# Patient Record
Sex: FEMALE | Race: WHITE | NOT HISPANIC OR LATINO | URBAN - METROPOLITAN AREA
[De-identification: names, ages, dates, MRNs, and addresses within clinical notes are randomized per-mention and may not be internally consistent; named-entity substitution may affect disease eponyms.]

---

## 2018-07-06 ENCOUNTER — IMPORTED ENCOUNTER (OUTPATIENT)
Dept: URBAN - METROPOLITAN AREA CLINIC 31 | Facility: CLINIC | Age: 48
End: 2018-07-06

## 2018-07-06 PROCEDURE — 92004 COMPRE OPH EXAM NEW PT 1/>: CPT

## 2018-07-06 PROCEDURE — 92015 DETERMINE REFRACTIVE STATE: CPT

## 2018-07-06 NOTE — PATIENT DISCUSSION
1.  Refractive error - recommend progressives. Discussed adaptation. 2. Return for an appointment in 1 year for comprehensive exam. with Dr. Ingris Wellington

## 2019-07-08 ENCOUNTER — IMPORTED ENCOUNTER (OUTPATIENT)
Dept: URBAN - METROPOLITAN AREA CLINIC 31 | Facility: CLINIC | Age: 49
End: 2019-07-08

## 2019-07-08 PROBLEM — H00.025: Noted: 2019-07-08

## 2019-07-08 PROCEDURE — 92014 COMPRE OPH EXAM EST PT 1/>: CPT

## 2019-07-08 PROCEDURE — 92015 DETERMINE REFRACTIVE STATE: CPT

## 2019-07-08 NOTE — PATIENT DISCUSSION
1.  Hordeolum LLL - WC QID and Maxitrol edu TID. 2. Refractive error - continue with SV reading glasses. 3. Return for an appointment in 1 year for comprehensive exam. with Dr. Simba Mccray. 4.  Return for an appointment in 2 weeks for office call. with Dr. Simba Mccray.

## 2019-07-26 ENCOUNTER — IMPORTED ENCOUNTER (OUTPATIENT)
Dept: URBAN - METROPOLITAN AREA CLINIC 31 | Facility: CLINIC | Age: 49
End: 2019-07-26

## 2019-07-26 PROBLEM — H00.15: Noted: 2019-07-26

## 2019-07-26 PROCEDURE — 99213 OFFICE O/P EST LOW 20 MIN: CPT

## 2019-07-26 NOTE — PATIENT DISCUSSION
Chalazion left lower eyelid - WC QD-BID  and Maxitrol edu TID. recommended waiting 1-2 MO if it gets worse call.

## 2020-07-08 ENCOUNTER — IMPORTED ENCOUNTER (OUTPATIENT)
Dept: URBAN - METROPOLITAN AREA CLINIC 31 | Facility: CLINIC | Age: 50
End: 2020-07-08

## 2020-07-08 PROCEDURE — 92015 DETERMINE REFRACTIVE STATE: CPT

## 2020-07-08 PROCEDURE — 92014 COMPRE OPH EXAM EST PT 1/>: CPT

## 2020-07-08 NOTE — PATIENT DISCUSSION
1.  Refractive error - recommend progressives. 2. Migraine with aura - see Dr. Binta Hayes. 3. Return for an appointment in 1 year for comprehensive exam. with Dr. Joselito Pearson.

## 2022-04-02 ASSESSMENT — VISUAL ACUITY
OD_SC: 20/40
OD_CC: 20/20
OD_CC: 20/20
OU_SC: 20/30
OS_CC: 20/20
OS_CC: 20/20
OS_CC: 20/25
OS_CC: 20/20
OD_CC: 20/20-2
OD_CC: 20/20-1
OU_SC: 20/30
OS_SC: 20/30
OS_CC: 20/20
OD_CC: 20/20-1

## 2022-04-02 ASSESSMENT — TONOMETRY
OD_IOP_MMHG: 18
OD_IOP_MMHG: 13
OS_IOP_MMHG: 15
OS_IOP_MMHG: 16
OS_IOP_MMHG: 14
OD_IOP_MMHG: 15

## 2022-07-09 ENCOUNTER — TELEPHONE ENCOUNTER (OUTPATIENT)
Dept: URBAN - METROPOLITAN AREA CLINIC 121 | Facility: CLINIC | Age: 52
End: 2022-07-09

## 2022-07-10 ENCOUNTER — TELEPHONE ENCOUNTER (OUTPATIENT)
Dept: URBAN - METROPOLITAN AREA CLINIC 121 | Facility: CLINIC | Age: 52
End: 2022-07-10

## 2022-07-10 RX ORDER — OMEPRAZOLE AND SODIUM BICARBONATE 40; 1100 MG/1; MG/1
PT RECEIVED SAMPLES AT THE FMO CAPSULE ORAL ONCE A DAY
Refills: 0 | Status: ACTIVE | COMMUNITY
Start: 2008-12-09